# Patient Record
Sex: FEMALE | Race: WHITE
[De-identification: names, ages, dates, MRNs, and addresses within clinical notes are randomized per-mention and may not be internally consistent; named-entity substitution may affect disease eponyms.]

---

## 2021-10-18 PROBLEM — Z00.00 ENCOUNTER FOR PREVENTIVE HEALTH EXAMINATION: Status: ACTIVE | Noted: 2021-10-18

## 2021-10-20 ENCOUNTER — APPOINTMENT (OUTPATIENT)
Dept: GASTROENTEROLOGY | Facility: CLINIC | Age: 71
End: 2021-10-20
Payer: MEDICARE

## 2021-10-20 VITALS — RESPIRATION RATE: 12 BRPM | HEART RATE: 78 BPM

## 2021-10-20 VITALS
BODY MASS INDEX: 46.61 KG/M2 | HEIGHT: 64 IN | TEMPERATURE: 97.2 F | SYSTOLIC BLOOD PRESSURE: 134 MMHG | WEIGHT: 273 LBS | DIASTOLIC BLOOD PRESSURE: 80 MMHG

## 2021-10-20 DIAGNOSIS — E78.2 MIXED HYPERLIPIDEMIA: ICD-10-CM

## 2021-10-20 DIAGNOSIS — G45.9 TRANSIENT CEREBRAL ISCHEMIC ATTACK, UNSPECIFIED: ICD-10-CM

## 2021-10-20 DIAGNOSIS — E66.01 MORBID (SEVERE) OBESITY DUE TO EXCESS CALORIES: ICD-10-CM

## 2021-10-20 DIAGNOSIS — Z12.11 ENCOUNTER FOR SCREENING FOR MALIGNANT NEOPLASM OF COLON: ICD-10-CM

## 2021-10-20 DIAGNOSIS — I10 ESSENTIAL (PRIMARY) HYPERTENSION: ICD-10-CM

## 2021-10-20 PROCEDURE — 99203 OFFICE O/P NEW LOW 30 MIN: CPT

## 2021-10-20 RX ORDER — RIVAROXABAN 2.5 MG/1
TABLET, FILM COATED ORAL
Refills: 0 | Status: ACTIVE | COMMUNITY

## 2021-10-20 RX ORDER — METOPROLOL SUCCINATE 200 MG/1
TABLET, EXTENDED RELEASE ORAL
Refills: 0 | Status: ACTIVE | COMMUNITY

## 2021-10-20 NOTE — HISTORY OF PRESENT ILLNESS
[___ Month(s) Ago] : [unfilled] month(s) ago [Heartburn] : denies heartburn [Nausea] : denies nausea [Vomiting] : denies vomiting [Diarrhea] : denies diarrhea [Constipation] : denies constipation [Yellow Skin Or Eyes (Jaundice)] : denies jaundice [Abdominal Pain] : denies abdominal pain [Abdominal Swelling] : denies abdominal swelling [Rectal Pain] : denies rectal pain [Abdominal Surgery] : abdominal surgery [Wt Gain ___ Lbs] : no recent weight gain [Wt Loss ___ Lbs] : no recent weight loss [GERD] : no gastroesophageal reflux disease [Hiatus Hernia] : no hiatus hernia [Peptic Ulcer Disease] : no peptic ulcer disease [Pancreatitis] : no pancreatitis [Cholelithiasis] : no cholelithiasis [Kidney Stone] : no kidney stone [Inflammatory Bowel Disease] : no inflammatory bowel disease [Irritable Bowel Syndrome] : no irritable bowel syndrome [Diverticulitis] : no diverticulitis [Alcohol Abuse] : no alcohol abuse [Malignancy] : no malignancy [Appendectomy] : no appendectomy [Cholecystectomy] : no cholecystectomy [de-identified] : left femur fracture [de-identified] : 71-year-old female distant history of TIA in 2007 for which she is maintained on Xarelto referred for colorectal cancer screening.  She has a history of obesity, hypertension and mixed hyperlipidemia.  There is no weight loss anorexia or change in bowel habits.

## 2021-10-20 NOTE — ASSESSMENT
[FreeTextEntry1] : 71-year-old female referred for colorectal cancer screening.  She has a distant history of TIA in 2007 and remains on Xarelto.  As discussed with the patient and in accordance with current guidelines the Xarelto will be held for 2 days prior to the procedure.  Follow-up with her primary care physician with regards to evaluation of her cough.  Her hypertension is well controlled.\par \par She was advised to undergo colonoscopy to which she  agreed. The procedure will be performed in [facility] with the assistance of an anesthesiologist. The procedure was explained in detail and she understood the risks of the procedure not limited  to infection, bleeding, perforation, or non-diagnosis of colon cancer. She  was advised that she could not drive home alone, if the patient chooses to receive sedation. Further diagnostic and treatment recommendations will be based upon the procedure and any biopsies, if they are taken.Xarelto to be held for 2 days prior to the procedure.\par

## 2021-10-20 NOTE — PHYSICAL EXAM
[General Appearance - Alert] : alert [General Appearance - In No Acute Distress] : in no acute distress [Sclera] : the sclera and conjunctiva were normal [PERRL With Normal Accommodation] : pupils were equal in size, round, and reactive to light [Extraocular Movements] : extraocular movements were intact [Oropharynx] : the oropharynx was normal [Outer Ear] : the ears and nose were normal in appearance [Neck Appearance] : the appearance of the neck was normal [Neck Cervical Mass (___cm)] : no neck mass was observed [Jugular Venous Distention Increased] : there was no jugular-venous distention [Thyroid Diffuse Enlargement] : the thyroid was not enlarged [Thyroid Nodule] : there were no palpable thyroid nodules [Auscultation Breath Sounds / Voice Sounds] : lungs were clear to auscultation bilaterally [Heart Rate And Rhythm] : heart rate was normal and rhythm regular [Heart Sounds Gallop] : no gallops [Heart Sounds] : normal S1 and S2 [Murmurs] : no murmurs [Heart Sounds Pericardial Friction Rub] : no pericardial rub [Full Pulse] : the pedal pulses are present [Edema] : there was no peripheral edema [Bowel Sounds] : normal bowel sounds [Abdomen Tenderness] : non-tender [Abdomen Soft] : soft [] : no hepato-splenomegaly [Abdomen Mass (___ Cm)] : no abdominal mass palpated [Cervical Lymph Nodes Enlarged Posterior Bilaterally] : posterior cervical [Cervical Lymph Nodes Enlarged Anterior Bilaterally] : anterior cervical [Axillary Lymph Nodes Enlarged Bilaterally] : axillary [Supraclavicular Lymph Nodes Enlarged Bilaterally] : supraclavicular [Femoral Lymph Nodes Enlarged Bilaterally] : femoral [Inguinal Lymph Nodes Enlarged Bilaterally] : inguinal [No CVA Tenderness] : no ~M costovertebral angle tenderness [No Spinal Tenderness] : no spinal tenderness [Nail Clubbing] : no clubbing  or cyanosis of the fingernails [Musculoskeletal - Swelling] : no joint swelling seen [Motor Tone] : muscle strength and tone were normal [Deep Tendon Reflexes (DTR)] : deep tendon reflexes were 2+ and symmetric [Sensation] : the sensory exam was normal to light touch and pinprick [No Focal Deficits] : no focal deficits [Oriented To Time, Place, And Person] : oriented to person, place, and time [Impaired Insight] : insight and judgment were intact [Affect] : the affect was normal [FreeTextEntry1] : gait with walker;  1 plus LE edema

## 2021-11-09 ENCOUNTER — RESULT REVIEW (OUTPATIENT)
Age: 71
End: 2021-11-09

## 2021-11-10 ENCOUNTER — APPOINTMENT (OUTPATIENT)
Dept: GASTROENTEROLOGY | Facility: AMBULATORY SURGERY CENTER | Age: 71
End: 2021-11-10
Payer: MEDICARE

## 2021-11-10 PROCEDURE — 45380 COLONOSCOPY AND BIOPSY: CPT | Mod: 59

## 2021-11-10 PROCEDURE — 45385 COLONOSCOPY W/LESION REMOVAL: CPT

## 2021-11-10 RX ORDER — SODIUM SULFATE, POTASSIUM SULFATE, MAGNESIUM SULFATE 17.5; 3.13; 1.6 G/ML; G/ML; G/ML
17.5-3.13-1.6 SOLUTION, CONCENTRATE ORAL
Qty: 2 | Refills: 0 | Status: DISCONTINUED | COMMUNITY
Start: 2021-10-20 | End: 2021-11-10

## 2021-12-02 ENCOUNTER — APPOINTMENT (OUTPATIENT)
Dept: PULMONOLOGY | Facility: CLINIC | Age: 71
End: 2021-12-02
Payer: MEDICARE

## 2021-12-02 VITALS
HEART RATE: 84 BPM | TEMPERATURE: 97.5 F | WEIGHT: 280 LBS | BODY MASS INDEX: 51.53 KG/M2 | OXYGEN SATURATION: 95 % | HEIGHT: 62 IN | DIASTOLIC BLOOD PRESSURE: 100 MMHG | SYSTOLIC BLOOD PRESSURE: 158 MMHG

## 2021-12-02 DIAGNOSIS — Z11.59 ENCOUNTER FOR SCREENING FOR OTHER VIRAL DISEASES: ICD-10-CM

## 2021-12-02 PROCEDURE — 99204 OFFICE O/P NEW MOD 45 MIN: CPT

## 2021-12-02 RX ORDER — SIMVASTATIN 80 MG/1
TABLET, FILM COATED ORAL
Refills: 0 | Status: COMPLETED | COMMUNITY
End: 2021-12-02

## 2021-12-02 RX ORDER — ROSUVASTATIN CALCIUM 5 MG/1
TABLET, FILM COATED ORAL
Refills: 0 | Status: ACTIVE | COMMUNITY

## 2021-12-02 NOTE — HISTORY OF PRESENT ILLNESS
[Never] : never [TextBox_4] : She is a 71 year old woman who has been complaining of a chronic cough. No prior history of pulmonary disease. She never smoked. \par \par The cough is non productive. Wheezes when she is supine. \par \par No fever, chills or sweats. No sick contacts. Fully vaccinated for COVID 19. \par \par The occupational history was unremarkable. \par \par \par

## 2021-12-02 NOTE — DISCUSSION/SUMMARY
[FreeTextEntry1] : She is a 71 year old woman with a history of hyperlipidemia, atrial fibrillation and TIA who has been complaining of a chronic cough. No prior history of pulmonary disease. She never smoked. \par \par The cough is non productive. Wheezes when she is supine. No fever, chills or sweats. No sick contacts. Fully vaccinated for COVID 19. \par \par There is no evidence of any active pulmonary disease at this time. A PFT will be obtained after a nasopharyngeal swab for COVID-19-PCR has been obtained and the result is negative. A chest x-ray was requested. \par \par Further recommendations to follow the results of the above. \par \par \par

## 2021-12-02 NOTE — PHYSICAL EXAM
[No Acute Distress] : no acute distress [Normal Oropharynx] : normal oropharynx [No Neck Mass] : no neck mass [Normal S1, S2] : normal s1, s2 [Clear to Auscultation Bilaterally] : clear to auscultation bilaterally [No Abnormalities] : no abnormalities [No HSM] : no hsm [No Clubbing] : no clubbing [No Cyanosis] : no cyanosis [No Edema] : no edema [Normal Turgor] : normal turgor [No Focal Deficits] : no focal deficits [Oriented x3] : oriented x3 [Normal Affect] : normal affect [TextBox_99] : walker

## 2021-12-03 LAB — SARS-COV-2 N GENE NPH QL NAA+PROBE: NOT DETECTED

## 2021-12-08 ENCOUNTER — APPOINTMENT (OUTPATIENT)
Dept: PULMONOLOGY | Facility: CLINIC | Age: 71
End: 2021-12-08
Payer: MEDICARE

## 2021-12-08 DIAGNOSIS — R05.9 COUGH, UNSPECIFIED: ICD-10-CM

## 2021-12-08 PROCEDURE — 94060 EVALUATION OF WHEEZING: CPT

## 2021-12-08 PROCEDURE — 94729 DIFFUSING CAPACITY: CPT

## 2021-12-08 PROCEDURE — 94727 GAS DIL/WSHOT DETER LNG VOL: CPT

## 2021-12-09 PROBLEM — R05.9 COUGH: Status: ACTIVE | Noted: 2021-10-20

## 2022-04-11 PROBLEM — Z11.59 SCREENING FOR VIRAL DISEASE: Status: ACTIVE | Noted: 2021-12-02

## 2023-03-16 ENCOUNTER — APPOINTMENT (OUTPATIENT)
Dept: GASTROENTEROLOGY | Facility: CLINIC | Age: 73
End: 2023-03-16